# Patient Record
Sex: MALE | Race: WHITE | NOT HISPANIC OR LATINO | URBAN - METROPOLITAN AREA
[De-identification: names, ages, dates, MRNs, and addresses within clinical notes are randomized per-mention and may not be internally consistent; named-entity substitution may affect disease eponyms.]

---

## 2019-02-13 ENCOUNTER — EMERGENCY (EMERGENCY)
Facility: HOSPITAL | Age: 24
LOS: 1 days | Discharge: HOME | End: 2019-02-13
Attending: EMERGENCY MEDICINE
Payer: COMMERCIAL

## 2019-02-13 VITALS
RESPIRATION RATE: 18 BRPM | SYSTOLIC BLOOD PRESSURE: 114 MMHG | OXYGEN SATURATION: 100 % | HEART RATE: 64 BPM | DIASTOLIC BLOOD PRESSURE: 57 MMHG | TEMPERATURE: 98 F

## 2019-02-13 VITALS — WEIGHT: 115.08 LBS | HEIGHT: 69.5 IN

## 2019-02-13 PROCEDURE — 71046 X-RAY EXAM CHEST 2 VIEWS: CPT | Mod: 26

## 2019-02-13 PROCEDURE — 99284 EMERGENCY DEPT VISIT MOD MDM: CPT | Mod: 25

## 2019-02-13 PROCEDURE — 71046 X-RAY EXAM CHEST 2 VIEWS: CPT

## 2019-02-13 PROCEDURE — 99283 EMERGENCY DEPT VISIT LOW MDM: CPT

## 2019-02-13 RX ORDER — IBUPROFEN 200 MG
600 TABLET ORAL ONCE
Qty: 0 | Refills: 0 | Status: COMPLETED | OUTPATIENT
Start: 2019-02-13 | End: 2019-02-13

## 2019-02-13 RX ADMIN — Medication 600 MILLIGRAM(S): at 11:13

## 2019-02-13 NOTE — ED PROVIDER NOTE - OBJECTIVE STATEMENT
22 y/o w no significant pmhx or pshx p/w intermittent sternal CP w occasional radiation to left and upper chest x2days worsening today. Pain described as sharp, pressure sensation, lasting around 30 seconds-1minute, episode coming at least 1 per hour. Denies any associated exertion or physical activity with onset of CP.  States he's never had pain like this before. Pain aggravated with lying down on either side, alleviated with lying down flat. Pt not in pain currently. +recent decrease in appetite, pt says he rarely feels hungry, feeling like stomach has 'shrunk', +intermittent SOB associated w the CP. Per dad, pt c/o 'blurry vision' recently as well. No recent fevers, chills, coughs, rhinorrhea, n/v/d or other complaints. No recent trauma. No recent sick contacts. No recent long periods of immobilization. Did not endorse meds for the pain. Not on daily meds. Pt smokes marijuana, occasional cigarettes. Denies EtOH or illicit drug use. NKDA. 22 y/o w no significant pmhx or pshx p/w intermittent sternal CP w occasionally located left or upper chest x2days worsening today. Pain described as sharp, pressure sensation, lasting around 30 seconds-1minute, episode coming at least 1 per hour. Denies any associated exertion or physical activity with onset of CP.  States he's never had pain like this before. Pain aggravated with lying down on either side, alleviated with lying down flat. Pt not in pain currently. +recent decrease in appetite, pt says he rarely feels hungry, feeling like stomach has 'shrunk', +intermittent SOB associated w the CP. Per dad, pt c/o 'blurry vision' recently as well, but pt denies. No recent fevers, chills, coughs, rhinorrhea, n/v/d or other complaints. No recent trauma. No recent sick contacts. No recent long periods of immobilization. Did not endorse meds for the pain. Not on daily meds. Pt smokes marijuana, occasional cigarettes. Denies EtOH or illicit drug use. NKDA. No early family history of cardiac disease

## 2019-02-13 NOTE — ED ADULT NURSE NOTE - NSIMPLEMENTINTERV_GEN_ALL_ED
Implemented All Universal Safety Interventions:  [place] to call system. Call bell, personal items and telephone within reach. Instruct patient to call for assistance. Room bathroom lighting operational. Non-slip footwear when patient is off stretcher. Physically safe environment: no spills, clutter or unnecessary equipment. Stretcher in lowest position, wheels locked, appropriate side rails in place.

## 2019-02-13 NOTE — ED PROVIDER NOTE - ATTENDING CONTRIBUTION TO CARE
Chelsi Zazueta MD - Attending Physician: I have personally seen and examined this patient with the resident/fellow.  I have fully participated in the care of this patient. I have reviewed all pertinent clinical information, including history, physical exam, plan and the Resident/Fellow’s note and agree except as noted. See MDM

## 2019-02-13 NOTE — ED PROVIDER NOTE - NSFOLLOWUPINSTRUCTIONS_ED_ALL_ED_FT
Chest Pain    Chest pain can be caused by many different conditions which may or may not be dangerous. Causes include heartburn, lung infections, heart attack, skin infections, strain or damage to muscle, cartilage, or bones, etc. In addition to a history and physical examination, an electrocardiogram (ECG) or other lab tests may have been performed to determine the cause of your chest pain. Follow up with your primary care provider or with a cardiologist as instructed.     SEEK IMMEDIATE MEDICAL CARE IF YOU HAVE ANY OF THE FOLLOWING SYMPTOMS: worsening chest pain, coughing up blood, unexplained back/neck/jaw pain, severe abdominal pain, dizziness or lightheadedness, fainting, shortness of breath, sweaty or clammy skin, vomiting, or racing heart beat. These symptoms may represent a serious problem that is an emergency. Do not wait to see if the symptoms will go away. Get medical help right away. Call 911 and do not drive yourself to the hospital.

## 2019-02-13 NOTE — ED PROVIDER NOTE - CLINICAL SUMMARY MEDICAL DECISION MAKING FREE TEXT BOX
22 y/o M  with CP, plan: chest X ray and ibuprofen, for symptomatic relief, ekg unremarkable, will reassess- MD Ryder 24 y/o M  with CP, plan: chest X ray and ibuprofen, for symptomatic relief, ekg unremarkable, will reassess- MD Chelsi Soler MD - Attending Physician: Pt here with atypical, intermittent chest pain. Well appearing, no concern for acute pathology needing immediate intervention. EKG, Xray, f/u outpatient

## 2019-02-13 NOTE — ED ADULT NURSE NOTE - OBJECTIVE STATEMENT
23 year old male patient presents ambulatory to ED c/o intermittent sternal chest pain x 2 days. Patient states pain lasts for about 30 seconds-1 minute is associated with SOB. Patient states pain became more frequent this morning so he became concerned. Patient otherwise well and denies recent sick contacts. Denies current pain or discomfort, abd pain, n/v/d, fever, chills. EKG completed, Patient placed on CM. Patient and family aware of plan of care. 23 year old male patient presents ambulatory to ED c/o intermittent sternal chest pain x 2 days. Patient states pain lasts for about 30 seconds-1 minute is associated with SOB. Patient states pain became more frequent this morning so he became concerned. Patient otherwise well and denies recent sick contacts. Denies current pain or discomfort, abd pain, n/v/d, fever, chills. Patient denies taking pain medication prior to arrival. EKG completed, Patient placed on CM. Patient and family aware of plan of care.

## 2019-02-13 NOTE — ED PROVIDER NOTE - PROGRESS NOTE DETAILS
EKG, CXR neg. Pt remains well. Neg exam. Supportive care. No concern for ACS, PE, or other life-threatening pathology. F/u outpatient. Return precautions discussed

## 2019-02-13 NOTE — ED ADULT NURSE REASSESSMENT NOTE - NS ED NURSE REASSESS COMMENT FT1
Received report from Maria Del Carmen CONTRERAS, patient  reports improvement in  chest pain, 0/10. Denies SOB, abdominal pain, n/v/d, fevers, chills, HA, blurry vision, weakness. VSS updated on plan of care.

## 2021-03-21 ENCOUNTER — EMERGENCY (EMERGENCY)
Facility: HOSPITAL | Age: 26
LOS: 1 days | Discharge: HOME | End: 2021-03-21
Admitting: STUDENT IN AN ORGANIZED HEALTH CARE EDUCATION/TRAINING PROGRAM
Payer: COMMERCIAL

## 2021-03-21 VITALS
SYSTOLIC BLOOD PRESSURE: 114 MMHG | DIASTOLIC BLOOD PRESSURE: 92 MMHG | TEMPERATURE: 98 F | RESPIRATION RATE: 16 BRPM | HEART RATE: 72 BPM | OXYGEN SATURATION: 100 % | HEIGHT: 69.5 IN

## 2021-03-21 DIAGNOSIS — F32.9 MAJOR DEPRESSIVE DISORDER, SINGLE EPISODE, UNSPECIFIED: ICD-10-CM

## 2021-03-21 PROCEDURE — 99284 EMERGENCY DEPT VISIT MOD MDM: CPT

## 2021-03-21 PROCEDURE — 90792 PSYCH DIAG EVAL W/MED SRVCS: CPT

## 2021-03-21 NOTE — ED PROVIDER NOTE - CLINICAL SUMMARY MEDICAL DECISION MAKING FREE TEXT BOX
26 y/o M pmh anxiety, depression BIB EMS for suicidal ideation. Pt reports he has been off his meds/not going to his psychiatrist for over a year now. Pt currently denies SI/HI, a/v h. Spoke with parents who reports he was erratic and angry today and stating he doesn't want to "be here anymore" / that he wants to die. Did not elaborate on a plan. Pt denies fever, chills    denies si, hi  -psych consult

## 2021-03-21 NOTE — ED BEHAVIORAL HEALTH ASSESSMENT NOTE - NSSUICPROTFACT_PSY_ALL_CORE
Responsibility to children, family, or others/Identifies reasons for living/Supportive social network of family or friends/[place] beliefs

## 2021-03-21 NOTE — ED PROVIDER NOTE - NSFOLLOWUPINSTRUCTIONS_ED_ALL_ED_FT
- If you feel overwhelming hopelessness with thoughts of hurting yourself or others, please call 9-1-1 or go the ER immediately  - Please take your medication as prescribed  - Please follow up with your primary care provider  - Please follow up with a psychiatrist     Advance activity as tolerated.  Continue all previously prescribed medications as directed unless otherwise instructed.  Follow up with your primary care physician in 48-72 hours- bring copies of your results.  Return to the ER for worsening or persistent symptoms, and/or ANY NEW OR CONCERNING SYMPTOMS. If you have issues obtaining follow up, please call: [phone] to obtain a doctor or specialist who takes your insurance in your area.  You may call [phone] to make an appointment with the internal medicine clinic.
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
## 2021-03-21 NOTE — ED BEHAVIORAL HEALTH ASSESSMENT NOTE - DESCRIPTION
calm and cooperative  ICU Vital Signs Last 24 Hrs  T(C): 36.7 (21 Mar 2021 20:06), Max: 36.7 (21 Mar 2021 20:06)  T(F): 98.1 (21 Mar 2021 20:06), Max: 98.1 (21 Mar 2021 20:06)  HR: 72 (21 Mar 2021 20:06) (72 - 72)  BP: 114/92 (21 Mar 2021 20:06) (114/92 - 114/92)  BP(mean): --  ABP: --  ABP(mean): --  RR: 16 (21 Mar 2021 20:06) (16 - 16)  SpO2: 100% (21 Mar 2021 20:06) (100% - 100%) denies lives with parents, unemployed, reports good relationship with older sister.

## 2021-03-21 NOTE — ED ADULT NURSE NOTE - CHIEF COMPLAINT QUOTE
pt from home, as per mother pt stated "I want to die I don't want to be here anymore." pt denies s/i, h/i, a/h, v/h or etoh and drug use. calm and cooperative in triage.  mother saida [phone]

## 2021-03-21 NOTE — ED PROVIDER NOTE - OBJECTIVE STATEMENT
24 y/o M pmh anxiety, depression BIB EMS for suicidal ideation. Pt reports he has been off his meds/not going to his psychiatrist for over a year now. Pt currently denies SI/HI, a/v h. Spoke with parents who reports he was erractic and angry today and stating he doesn't want to "be here anymore" / that he wants to die. Did not elaborate on a plan. Pt denies fever, chills 24 y/o M pmh anxiety, depression BIB EMS for suicidal ideation. Pt reports he has been off his meds/not going to his psychiatrist for over a year now. Pt currently denies SI/HI, a/v h. Spoke with parents who reports he was erratic and angry today and stating he doesn't want to "be here anymore" / that he wants to die. Did not elaborate on a plan. Pt denies fever, chills

## 2021-03-21 NOTE — ED PROVIDER NOTE - IV ALTEPLASE EXCL ABS HIDDEN
show
PMI and heart sounds localize heart on left side of chest; murmurs absent; pulse with normal variation, frequency and intensity (amplitude or strength) with equal intensity on upper and lower extremities; blood pressure value(s) are adequate.
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
## 2021-03-21 NOTE — ED ADULT TRIAGE NOTE - CHIEF COMPLAINT QUOTE
pt from home, as per mother pt stated "I want to die I don't want to be here anymore." pt denies s/i, h/i, a/h, v/h or etoh and drug use. calm and cooperative in triage. pt from home, as per mother pt stated "I want to die I don't want to be here anymore." pt denies s/i, h/i, a/h, v/h or etoh and drug use. calm and cooperative in triage.  mother saida [phone]

## 2021-03-21 NOTE — ED BEHAVIORAL HEALTH ASSESSMENT NOTE - DETAILS
n/a reports passive suicidal ideation stating, " I don't want to die. I just want things to change." mother made aware of discharge see  safety plan

## 2021-03-21 NOTE — ED ADULT NURSE REASSESSMENT NOTE - NS ED NURSE REASSESS COMMENT FT1
Pt cleared for discharge back to home.  Pt provided with resources for follow-up and discharge instructions.  Pt picked up by his mother and younger sister.

## 2021-03-21 NOTE — ED BEHAVIORAL HEALTH ASSESSMENT NOTE - RISK ASSESSMENT
Patient is futuristic ( looking forward to meeting a woman and having children one day), states he would never attempt suicide, no known attempts, in agreement to outpatient treatment. Low Acute Suicide Risk

## 2021-03-21 NOTE — ED PROVIDER NOTE - PATIENT PORTAL LINK FT
You can access the FollowMyHealth Patient Portal offered by [facility] by registering at the following website: [email or web address]. By joining [employer]’s FollowMyHealth portal, you will also be able to view your health information using other applications (apps) compatible with our system.
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
